# Patient Record
Sex: MALE | Race: OTHER | Employment: UNEMPLOYED | URBAN - METROPOLITAN AREA
[De-identification: names, ages, dates, MRNs, and addresses within clinical notes are randomized per-mention and may not be internally consistent; named-entity substitution may affect disease eponyms.]

---

## 2023-08-31 ENCOUNTER — OFFICE VISIT (OUTPATIENT)
Dept: FAMILY MEDICINE CLINIC | Age: 18
End: 2023-08-31
Payer: COMMERCIAL

## 2023-08-31 ENCOUNTER — HOSPITAL ENCOUNTER (OUTPATIENT)
Age: 18
Discharge: HOME | End: 2023-09-02
Payer: COMMERCIAL

## 2023-08-31 ENCOUNTER — HOSPITAL ENCOUNTER (OUTPATIENT)
Dept: GENERAL RADIOLOGY | Age: 18
Discharge: HOME | End: 2023-09-02
Payer: COMMERCIAL

## 2023-08-31 VITALS
WEIGHT: 173 LBS | HEART RATE: 68 BPM | DIASTOLIC BLOOD PRESSURE: 68 MMHG | SYSTOLIC BLOOD PRESSURE: 110 MMHG | RESPIRATION RATE: 16 BRPM | OXYGEN SATURATION: 98 % | TEMPERATURE: 97 F | BODY MASS INDEX: 27.15 KG/M2 | HEIGHT: 67 IN

## 2023-08-31 DIAGNOSIS — Z13.1 SCREENING FOR DIABETES MELLITUS: ICD-10-CM

## 2023-08-31 DIAGNOSIS — Z13.220 SCREENING, LIPID: ICD-10-CM

## 2023-08-31 DIAGNOSIS — M25.552 LEFT HIP PAIN: Primary | ICD-10-CM

## 2023-08-31 DIAGNOSIS — M25.552 LEFT HIP PAIN: ICD-10-CM

## 2023-08-31 PROCEDURE — 73502 X-RAY EXAM HIP UNI 2-3 VIEWS: CPT

## 2023-08-31 PROCEDURE — 99203 OFFICE O/P NEW LOW 30 MIN: CPT | Performed by: FAMILY MEDICINE

## 2023-08-31 SDOH — ECONOMIC STABILITY: FOOD INSECURITY: WITHIN THE PAST 12 MONTHS, THE FOOD YOU BOUGHT JUST DIDN'T LAST AND YOU DIDN'T HAVE MONEY TO GET MORE.: NEVER TRUE

## 2023-08-31 SDOH — ECONOMIC STABILITY: INCOME INSECURITY: HOW HARD IS IT FOR YOU TO PAY FOR THE VERY BASICS LIKE FOOD, HOUSING, MEDICAL CARE, AND HEATING?: NOT HARD AT ALL

## 2023-08-31 SDOH — HEALTH STABILITY: PHYSICAL HEALTH: ON AVERAGE, HOW MANY DAYS PER WEEK DO YOU ENGAGE IN MODERATE TO STRENUOUS EXERCISE (LIKE A BRISK WALK)?: 0 DAYS

## 2023-08-31 SDOH — ECONOMIC STABILITY: HOUSING INSECURITY
IN THE LAST 12 MONTHS, WAS THERE A TIME WHEN YOU DID NOT HAVE A STEADY PLACE TO SLEEP OR SLEPT IN A SHELTER (INCLUDING NOW)?: NO

## 2023-08-31 SDOH — ECONOMIC STABILITY: FOOD INSECURITY: WITHIN THE PAST 12 MONTHS, YOU WORRIED THAT YOUR FOOD WOULD RUN OUT BEFORE YOU GOT MONEY TO BUY MORE.: NEVER TRUE

## 2023-08-31 ASSESSMENT — PATIENT HEALTH QUESTIONNAIRE - PHQ9
SUM OF ALL RESPONSES TO PHQ QUESTIONS 1-9: 0
1. LITTLE INTEREST OR PLEASURE IN DOING THINGS: 0
SUM OF ALL RESPONSES TO PHQ QUESTIONS 1-9: 0
SUM OF ALL RESPONSES TO PHQ QUESTIONS 1-9: 0
SUM OF ALL RESPONSES TO PHQ9 QUESTIONS 1 & 2: 0
2. FEELING DOWN, DEPRESSED OR HOPELESS: 0
SUM OF ALL RESPONSES TO PHQ QUESTIONS 1-9: 0

## 2023-08-31 ASSESSMENT — ENCOUNTER SYMPTOMS
EYE DISCHARGE: 0
RHINORRHEA: 0
SHORTNESS OF BREATH: 0
SINUS PAIN: 0
EYE REDNESS: 0
COUGH: 0
PHOTOPHOBIA: 0

## 2023-08-31 NOTE — PROGRESS NOTES
soft.      Tenderness: There is no abdominal tenderness. Musculoskeletal:      Cervical back: Neck supple. Left hip: Tenderness (Anterior) present. No bony tenderness or crepitus. Decreased range of motion. Skin:     General: Skin is warm. Neurological:      Mental Status: He is alert and oriented to person, place, and time. Psychiatric:         Mood and Affect: Mood normal.       ASSESSMENT & PLAN    Mando was seen today for new patient and joint pain. Diagnoses and all orders for this visit:    Left hip pain  -     XR HIP LEFT (2-3 VIEWS); Future  -     [address] DO, Orthopaedics and Sports Medicine, [facility]  X-rays of the left hip will be done today. Referral has been placed to orthopedics for further evaluation and treatment. No medications have been prescribed at this time. Screening, lipid  -     Lipid Panel; Future    Screening for diabetes mellitus  -     Comprehensive Metabolic Panel; Future        Return in about 2 months (around 10/31/2023). Electronically signed by Panda Judd DO on 8/31/2023

## 2023-09-27 ENCOUNTER — OFFICE VISIT (OUTPATIENT)
Dept: ORTHOPEDIC SURGERY | Age: 18
End: 2023-09-27
Payer: COMMERCIAL

## 2023-09-27 VITALS — TEMPERATURE: 98 F | HEIGHT: 67 IN | BODY MASS INDEX: 27.62 KG/M2 | WEIGHT: 176 LBS

## 2023-09-27 DIAGNOSIS — M21.70 LEG LENGTH DISCREPANCY: Primary | ICD-10-CM

## 2023-09-27 PROCEDURE — 99213 OFFICE O/P EST LOW 20 MIN: CPT | Performed by: ORTHOPAEDIC SURGERY

## 2023-09-27 NOTE — PROGRESS NOTES
Jose Novoa is a 19 y.o. male, who presents   Chief Complaint   Patient presents with    Hip Pain     Left Hip, x years, pain increasing, states has Hip dysplasia. No previous hip surgery. Referral from [address] [place] [address]       HPI[de-identified] Left hip area pains been present for some time. Tomorrow was apparently told several years ago that he had left hip dysplasia. He has had some pain with more activity or with certain positions of the left lower extremity. He indicates the discomfort is high in the groin. He has had no injuries. He is not involved in sports at present. He has noticed that his legs are not the same length when he tends to trip on occasions. Allergies; medications; past medical, surgical, family, and social history; and problem list have been reviewed today and updated as indicated in this encounter - see below following Ortho specifics. Musculoskeletal: Skin condition and gross neurovascular functions good in both lower extremities. Knee range of motion stability is good in both sides. Ligaments are stable and motor function is good. The right hip range of motion is good with no pain, apprehension or instability. Left hip range of motion shows slightly more external rotation than the right but there is really no abnormality detected. He has an excellent range of motion and good stability no crepitus or pain. There is noticeable leg length difference with the patient supine and relaxed the difference in medial malleolar level is 2-2.5 centimeter shorter. With hips and knees flexed it seems that the thigh segment on the left is short. Radiologic Studies: Imaging studies of the pelvis and left hip 8/31/2023 shows normal looking right and left hips. Femoral head size is the same on each and neck angle and offset is symmetrical.    ASSESSMENT:  Mando was seen today for hip pain.     Diagnoses and all orders for this visit:    Leg length discrepancy     Treatment alternatives were